# Patient Record
Sex: FEMALE | Race: WHITE | NOT HISPANIC OR LATINO | Employment: FULL TIME | URBAN - METROPOLITAN AREA
[De-identification: names, ages, dates, MRNs, and addresses within clinical notes are randomized per-mention and may not be internally consistent; named-entity substitution may affect disease eponyms.]

---

## 2020-01-20 ENCOUNTER — OFFICE VISIT (OUTPATIENT)
Dept: NEUROLOGY | Facility: CLINIC | Age: 30
End: 2020-01-20

## 2020-01-20 VITALS — HEIGHT: 64 IN | OXYGEN SATURATION: 98 % | HEART RATE: 84 BPM

## 2020-01-20 DIAGNOSIS — G40.309 NONINTRACTABLE GENERALIZED IDIOPATHIC EPILEPSY WITHOUT STATUS EPILEPTICUS (HCC): Primary | ICD-10-CM

## 2020-01-20 PROCEDURE — 99213 OFFICE O/P EST LOW 20 MIN: CPT | Performed by: PSYCHIATRY & NEUROLOGY

## 2020-01-20 RX ORDER — LEVETIRACETAM 500 MG/1
500 TABLET ORAL 2 TIMES DAILY
COMMUNITY
Start: 2020-01-04 | End: 2020-01-20

## 2020-01-20 RX ORDER — NORETHINDRONE ACETATE AND ETHINYL ESTRADIOL AND FERROUS FUMARATE 1.5-30(21)
KIT ORAL
COMMUNITY
Start: 2020-01-15

## 2020-01-20 NOTE — PROGRESS NOTES
Chief Complaint   Patient presents with   • Seizures       Patient ID: Jaye Mcdaniels is a 29 y.o. female.    HPI: I have had the pleasure of seeing your patient Jaye in the neurology clinic this afternoon.  As you may know she is a 29-year-old female with a history of seizures.  She has not had a breakthrough seizure in several years now.  She is taking Keppra 500 mg twice daily.  She denies any side effects of that medication.  Her last seizure was in 2010.  She has had 3 total.  She does have generalized tonic-clonic seizures.  She denies any new symptoms neurologically.  No particular aura type sensations or warning of a seizure to come.    The following portions of the patient's history were reviewed and updated as appropriate: allergies, current medications, past family history, past medical history, past social history, past surgical history and problem list.    Review of Systems   Constitutional: Negative for activity change, appetite change and fatigue.   HENT: Negative for facial swelling, hearing loss and trouble swallowing.    Eyes: Negative for photophobia, redness and visual disturbance.   Respiratory: Negative for chest tightness, shortness of breath and wheezing.    Cardiovascular: Negative for chest pain, palpitations and leg swelling.   Gastrointestinal: Negative for abdominal pain, nausea and vomiting.   Endocrine: Negative for cold intolerance, heat intolerance and polydipsia.   Musculoskeletal: Negative for back pain, gait problem and neck pain.   Skin: Negative for color change, rash and wound.   Allergic/Immunologic: Negative for environmental allergies, food allergies and immunocompromised state.   Neurological: Positive for seizures (last seizure 08/30/2010). Negative for dizziness, tremors, syncope, facial asymmetry, speech difficulty, weakness, light-headedness, numbness and headaches.   Hematological: Negative for adenopathy. Does not bruise/bleed easily.   Psychiatric/Behavioral:  Negative for agitation, behavioral problems, confusion, decreased concentration, dysphoric mood, hallucinations, self-injury, sleep disturbance and suicidal ideas. The patient is not nervous/anxious and is not hyperactive.       I reviewed and agree with the above review of systems completed by the medical assistant.    Vitals:    20 0821   Pulse: 84   SpO2: 98%       Neurologic Exam     Mental Status   Oriented to person, place, and time.   Concentration: normal.   Level of consciousness: alert  Knowledge: consistent with education (No deficits found.).     Cranial Nerves     CN II   Visual fields full to confrontation.     CN III, IV, VI   Pupils are equal, round, and reactive to light.  Extraocular motions are normal.   CN III: no CN III palsy  CN VI: no CN VI palsy    CN V   Facial sensation intact.     CN VII   Facial expression full, symmetric.     CN VIII   CN VIII normal.     CN IX, X   CN IX normal.   CN X normal.     CN XI   CN XI normal.     CN XII   CN XII normal.     Motor Exam     Strength   Right neck flexion: 5/5  Left neck flexion: 5/5  Right neck extension: 5/5  Left neck extension: 5/5  Right deltoid: 5/5  Left deltoid: 5/5  Right biceps: 5/5  Left biceps: 5/5  Right triceps: 5/5  Left triceps: 5/5  Right wrist flexion: 5/5  Left wrist flexion: 5/5  Right wrist extension: 5/5  Left wrist extension: 5/5  Right interossei: 5/5  Left interossei: 5/5  Right abdominals: 5/5  Left abdominals: 5/5  Right iliopsoas: 5/5  Left iliopsoas: 5/5  Right quadriceps: 5/5  Left quadriceps: 5/5  Right hamstrin/5  Left hamstrin/5  Right glutei: 5/5  Left glutei: 5/5  Right anterior tibial: 5/5  Left anterior tibial: 5/5  Right posterior tibial: 5/5  Left posterior tibial: 5/5  Right peroneal: 5/5  Left peroneal: 5/5  Right gastroc: 5/5  Left gastroc: 5/5    Sensory Exam   Light touch normal.   Vibration normal.     Gait, Coordination, and Reflexes     Gait  Gait: normal    Reflexes   Right  brachioradialis: 2+  Left brachioradialis: 2+  Right biceps: 2+  Left biceps: 2+  Right triceps: 2+  Left triceps: 2+  Right patellar: 2+  Left patellar: 2+  Right achilles: 2+  Left achilles: 2+  Right : 2+  Left : 2+Station is normal.       Physical Exam   Constitutional: She is oriented to person, place, and time. She appears well-developed and well-nourished.   HENT:   Head: Normocephalic and atraumatic.   Eyes: Pupils are equal, round, and reactive to light. EOM are normal.   Cardiovascular: Normal rate and regular rhythm.   Pulmonary/Chest: Breath sounds normal.   Musculoskeletal: Normal range of motion.   Neurological: She is oriented to person, place, and time. Gait normal.   Reflex Scores:       Tricep reflexes are 2+ on the right side and 2+ on the left side.       Bicep reflexes are 2+ on the right side and 2+ on the left side.       Brachioradialis reflexes are 2+ on the right side and 2+ on the left side.       Patellar reflexes are 2+ on the right side and 2+ on the left side.       Achilles reflexes are 2+ on the right side and 2+ on the left side.  Skin: Skin is warm.   Vitals reviewed.      Procedures    Assessment/Plan: We will continue with the Keppra 500 mg twice daily.  Return to clinic in 1 year or sooner if needed.  A total of 15 minutes was spent face-to-face with the patient today.  Of that greater than 50% was spent discussing signs and symptoms of seizures, patient education, plan of care and prognosis.       Jaye was seen today for seizures.    Diagnoses and all orders for this visit:    Nonintractable generalized idiopathic epilepsy without status epilepticus (CMS/HCC)           Joel Waldron II, MD

## 2020-06-11 RX ORDER — LEVETIRACETAM 500 MG/1
TABLET ORAL
Qty: 180 TABLET | Refills: 1 | Status: SHIPPED | OUTPATIENT
Start: 2020-06-11 | End: 2020-12-18

## 2020-12-18 DIAGNOSIS — G40.309 NONINTRACTABLE GENERALIZED IDIOPATHIC EPILEPSY WITHOUT STATUS EPILEPTICUS (HCC): Primary | ICD-10-CM

## 2020-12-18 RX ORDER — LEVETIRACETAM 500 MG/1
TABLET ORAL
Qty: 180 TABLET | Refills: 0 | Status: SHIPPED | OUTPATIENT
Start: 2020-12-18 | End: 2021-03-15

## 2020-12-18 NOTE — TELEPHONE ENCOUNTER
Pt last seen on 01/20/2020. Pt to f/u in 1 year. Does not have an apt scheduled. Please advise. Thank you.

## 2021-01-21 ENCOUNTER — TELEPHONE (OUTPATIENT)
Dept: NEUROLOGY | Facility: CLINIC | Age: 31
End: 2021-01-21

## 2021-01-21 NOTE — TELEPHONE ENCOUNTER
JEF ALAN  [phone]    THE PT CALLED IN TO SCHEDULED HER 1 YEAR FOLLOW UP WITH DR COTTO TODAY AND THE FIRST AVAILABLE APPT WAS IN MAY SO SHE HAS BEEN SCHEDULED FOR 05/03. THE PT WAS LAST SEEN 01/20/20. SHE IS WANTING TO BE SEEN SOONER BECAUSE OF HER MEDICATION. SHE STATES SHE WILL BE OUT OF MEDICATION ROUGHLY AROUND MARCH 18. IS THERE ANY WAY SHE CAN BE SEEN BEFORE THEN? PLEASE GIVE HER A CALL TO DISCUSS.

## 2021-01-22 NOTE — TELEPHONE ENCOUNTER
Please advise. Called pt had Leave message. We can put pt on cancellation list. However since she has an apt we can give her enough refills until her apt in may. And then call for a sooner apt if needed. Please advise I told patient to call in march when she needs refills or let [facility] know to send us over a request we will make sure she has enough medication to last until her apt. Thank you.

## 2021-03-13 DIAGNOSIS — G40.309 NONINTRACTABLE GENERALIZED IDIOPATHIC EPILEPSY WITHOUT STATUS EPILEPTICUS (HCC): ICD-10-CM

## 2021-03-15 RX ORDER — LEVETIRACETAM 500 MG/1
TABLET ORAL
Qty: 180 TABLET | Refills: 0 | Status: SHIPPED | OUTPATIENT
Start: 2021-03-15 | End: 2021-06-15

## 2021-03-24 ENCOUNTER — TELEPHONE (OUTPATIENT)
Dept: NEUROLOGY | Facility: CLINIC | Age: 31
End: 2021-03-24

## 2021-03-24 NOTE — TELEPHONE ENCOUNTER
Dr. Steve (oral surgeon) called to see if Patient could go under general anesthesia.  was given post-it to call Dr. Steve back. [phone]

## 2021-05-03 ENCOUNTER — OFFICE VISIT (OUTPATIENT)
Dept: NEUROLOGY | Facility: CLINIC | Age: 31
End: 2021-05-03

## 2021-05-03 VITALS
DIASTOLIC BLOOD PRESSURE: 70 MMHG | SYSTOLIC BLOOD PRESSURE: 112 MMHG | OXYGEN SATURATION: 99 % | HEIGHT: 64 IN | HEART RATE: 79 BPM | WEIGHT: 149 LBS | BODY MASS INDEX: 25.44 KG/M2

## 2021-05-03 DIAGNOSIS — G40.309 NONINTRACTABLE GENERALIZED IDIOPATHIC EPILEPSY WITHOUT STATUS EPILEPTICUS (HCC): Primary | ICD-10-CM

## 2021-05-03 PROCEDURE — 99214 OFFICE O/P EST MOD 30 MIN: CPT | Performed by: PSYCHIATRY & NEUROLOGY

## 2021-05-03 RX ORDER — ACETAMINOPHEN AND CODEINE PHOSPHATE 120; 12 MG/5ML; MG/5ML
1 SOLUTION ORAL DAILY
COMMUNITY
Start: 2021-03-22

## 2021-05-03 RX ORDER — PRENATAL VIT/IRON FUM/FOLIC AC 27MG-0.8MG
1 TABLET ORAL DAILY
COMMUNITY

## 2021-05-03 NOTE — PROGRESS NOTES
Chief Complaint   Patient presents with   • Seizures       Patient ID: Jaye Mcdaniels is a 30 y.o. female.    HPI: I had the pleasure of seeing your patient.  As you may know she is a 30-year-old female with a history of seizures.  She has done quite well overall.  Her last seizure was in 2010.  She is currently taking Keppra 500 mg twice daily.  She says that the Keppra may cause some issues with short-term memory however not significant enough to try and change the medication for her.  She also has noted some slight anxiety issues however again not quality of life changing.  She does have an interest in trying to wean off medication since it has been much more than 2 years since her last seizure.  However she recently had a child who is now about 4 months old.  Therefore she would likely hold off for now.  No new symptoms neurologically.    The following portions of the patient's history were reviewed and updated as appropriate: allergies, current medications, past family history, past medical history, past social history, past surgical history and problem list.    Review of Systems   Constitutional: Negative for activity change, appetite change and fatigue.   Musculoskeletal: Negative for back pain, gait problem and neck pain.   Allergic/Immunologic: Negative for environmental allergies, food allergies and immunocompromised state.   Neurological: Positive for seizures (no recent seizures. ). Negative for dizziness, tremors, syncope, facial asymmetry, speech difficulty, weakness, light-headedness, numbness and headaches.   Hematological: Negative for adenopathy. Does not bruise/bleed easily.   Psychiatric/Behavioral: Negative for agitation, behavioral problems, confusion, decreased concentration, dysphoric mood, hallucinations, self-injury, sleep disturbance and suicidal ideas. The patient is not nervous/anxious and is not hyperactive.       I have reviewed the review of systems above performed by my medical  assistant.      Vitals:    21 0836   BP: 112/70   Pulse: 79   SpO2: 99%       Neurologic Exam     Mental Status   Oriented to person, place, and time.   Concentration: normal.   Level of consciousness: alert  Knowledge: consistent with education (No deficits found.).     Cranial Nerves     CN II   Visual fields full to confrontation.     CN III, IV, VI   Pupils are equal, round, and reactive to light.  Extraocular motions are normal.   CN III: no CN III palsy  CN VI: no CN VI palsy    CN V   Facial sensation intact.     CN VII   Facial expression full, symmetric.     CN VIII   CN VIII normal.     CN IX, X   CN IX normal.   CN X normal.     CN XI   CN XI normal.     CN XII   CN XII normal.     Motor Exam     Strength   Right neck flexion: 5/5  Left neck flexion: 5/5  Right neck extension: 5/5  Left neck extension: 5/5  Right deltoid: 5/5  Left deltoid: 5/5  Right biceps: 5/5  Left biceps: 5/5  Right triceps: 5/5  Left triceps: 5/5  Right wrist flexion: 5/5  Left wrist flexion: 5/5  Right wrist extension: 5/5  Left wrist extension: 5/5  Right interossei: 5/5  Left interossei: 5/5  Right abdominals: 5/5  Left abdominals: 5/5  Right iliopsoas: 5/5  Left iliopsoas: 5/5  Right quadriceps: 5/5  Left quadriceps: 5/5  Right hamstrin/5  Left hamstrin/5  Right glutei: 5/5  Left glutei: 5/5  Right anterior tibial: 5/5  Left anterior tibial: 5/5  Right posterior tibial: 5/5  Left posterior tibial: 5/5  Right peroneal: 5/5  Left peroneal: 5/5  Right gastroc: 5/5  Left gastroc: 5/5    Sensory Exam   Light touch normal.   Vibration normal.     Gait, Coordination, and Reflexes     Gait  Gait: normal    Reflexes   Right brachioradialis: 2+  Left brachioradialis: 2+  Right biceps: 2+  Left biceps: 2+  Right triceps: 2+  Left triceps: 2+  Right patellar: 2+  Left patellar: 2+  Right achilles: 2+  Left achilles: 2+  Right : 2+  Left : 2+Station is normal.       Physical Exam  Vitals reviewed.   Constitutional:        Appearance: She is well-developed.   HENT:      Head: Normocephalic and atraumatic.   Eyes:      Extraocular Movements: EOM normal.      Pupils: Pupils are equal, round, and reactive to light.   Cardiovascular:      Rate and Rhythm: Normal rate and regular rhythm.   Pulmonary:      Breath sounds: Normal breath sounds.   Musculoskeletal:         General: Normal range of motion.   Skin:     General: Skin is warm.   Neurological:      Mental Status: She is oriented to person, place, and time.      Gait: Gait is intact.      Deep Tendon Reflexes:      Reflex Scores:       Tricep reflexes are 2+ on the right side and 2+ on the left side.       Bicep reflexes are 2+ on the right side and 2+ on the left side.       Brachioradialis reflexes are 2+ on the right side and 2+ on the left side.       Patellar reflexes are 2+ on the right side and 2+ on the left side.       Achilles reflexes are 2+ on the right side and 2+ on the left side.        Procedures    Assessment/Plan: For now we will continue with the current dose of Keppra 500 mg twice daily.  May consider weaning in the future however we will discuss that further at her follow-up appointment in 1 year.  A total of 30 minutes was spent face-to-face with the patient today.  Of that greater than 50% of this time was spent discussing signs and symptoms of seizures, patient education, plan of care and prognosis.       Diagnoses and all orders for this visit:    1. 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

## 2021-06-15 DIAGNOSIS — G40.309 NONINTRACTABLE GENERALIZED IDIOPATHIC EPILEPSY WITHOUT STATUS EPILEPTICUS (HCC): ICD-10-CM

## 2021-06-15 RX ORDER — LEVETIRACETAM 500 MG/1
TABLET ORAL
Qty: 180 TABLET | Refills: 0 | Status: SHIPPED | OUTPATIENT
Start: 2021-06-15 | End: 2021-08-30

## 2021-08-28 DIAGNOSIS — G40.309 NONINTRACTABLE GENERALIZED IDIOPATHIC EPILEPSY WITHOUT STATUS EPILEPTICUS (HCC): ICD-10-CM

## 2021-08-30 RX ORDER — LEVETIRACETAM 500 MG/1
TABLET ORAL
Qty: 180 TABLET | Refills: 1 | Status: SHIPPED | OUTPATIENT
Start: 2021-08-30 | End: 2022-03-14

## 2022-03-13 DIAGNOSIS — G40.309 NONINTRACTABLE GENERALIZED IDIOPATHIC EPILEPSY WITHOUT STATUS EPILEPTICUS: ICD-10-CM

## 2022-03-14 RX ORDER — LEVETIRACETAM 500 MG/1
TABLET ORAL
Qty: 180 TABLET | Refills: 1 | Status: SHIPPED | OUTPATIENT
Start: 2022-03-14 | End: 2022-09-06

## 2022-05-06 ENCOUNTER — OFFICE VISIT (OUTPATIENT)
Dept: NEUROLOGY | Facility: CLINIC | Age: 32
End: 2022-05-06

## 2022-05-06 VITALS
DIASTOLIC BLOOD PRESSURE: 84 MMHG | HEART RATE: 98 BPM | SYSTOLIC BLOOD PRESSURE: 122 MMHG | BODY MASS INDEX: 25.61 KG/M2 | HEIGHT: 64 IN | OXYGEN SATURATION: 100 % | WEIGHT: 150 LBS

## 2022-05-06 DIAGNOSIS — G40.309 NONINTRACTABLE GENERALIZED IDIOPATHIC EPILEPSY WITHOUT STATUS EPILEPTICUS: Primary | ICD-10-CM

## 2022-05-06 PROBLEM — R76.8 HEPATITIS C ANTIBODY TEST POSITIVE: Status: ACTIVE | Noted: 2020-05-29

## 2022-05-06 PROBLEM — Z37.9 NORMAL LABOR: Status: ACTIVE | Noted: 2021-01-05

## 2022-05-06 PROCEDURE — 99214 OFFICE O/P EST MOD 30 MIN: CPT | Performed by: PSYCHIATRY & NEUROLOGY

## 2022-05-06 NOTE — PROGRESS NOTES
Chief Complaint   Patient presents with   • Seizures       Patient ID: Jaye Mcdaniels is a 31 y.o. female.    HPI:  I had the pleasure of seeing your patient.  As you may know she is a 31-year-old female here for the management of seizures.  She has done quite well overall.  Her last seizure was in 2010.  She is currently taking Keppra 500 mg twice daily.  She says that the Keppra may cause some issues with irritability however not significant enough to try and change the medication for her.  She also has noted some slight anxiety issues however again not quality of life changing.  No new symptoms neurologically.    The following portions of the patient's history were reviewed and updated as appropriate: allergies, current medications, past family history, past medical history, past social history, past surgical history and problem list.    Review of Systems   Constitutional: Negative for fatigue.   HENT: Positive for dental problem. Negative for facial swelling, sinus pressure and sinus pain.    Respiratory: Negative for chest tightness and shortness of breath.    Cardiovascular: Negative for chest pain, palpitations and leg swelling.   Gastrointestinal: Negative for nausea.   Genitourinary: Negative for frequency and urgency.   Musculoskeletal: Negative for back pain, gait problem, joint swelling, neck pain and neck stiffness.   Neurological: Positive for seizures. Negative for dizziness, tremors, syncope, speech difficulty, weakness, light-headedness, numbness and headaches.   Hematological: Does not bruise/bleed easily.   Psychiatric/Behavioral: Negative for agitation, behavioral problems, confusion, decreased concentration, self-injury and suicidal ideas. The patient is nervous/anxious.       I have reviewed the review of systems above performed by my medical assistant.      Vitals:    05/06/22 1125   BP: 122/84   Pulse: 98   SpO2: 100%       Neurologic Exam     Mental Status   Oriented to person, place, and  time.   Concentration: normal.   Level of consciousness: alert  Knowledge: consistent with education (No deficits found.).     Cranial Nerves     CN II   Visual fields full to confrontation.     CN III, IV, VI   Pupils are equal, round, and reactive to light.  Extraocular motions are normal.   CN III: no CN III palsy  CN VI: no CN VI palsy    CN V   Facial sensation intact.     CN VII   Facial expression full, symmetric.     CN VIII   CN VIII normal.     CN IX, X   CN IX normal.   CN X normal.     CN XI   CN XI normal.     CN XII   CN XII normal.     Motor Exam     Strength   Right neck flexion: 5/5  Left neck flexion: 5/5  Right neck extension: 5/5  Left neck extension: 5/5  Right deltoid: 5/5  Left deltoid: 5/5  Right biceps: 5/5  Left biceps: 5/5  Right triceps: 5/5  Left triceps: 5/5  Right wrist flexion: 5/5  Left wrist flexion: 5/5  Right wrist extension: 5/5  Left wrist extension: 5/5  Right interossei: 5/5  Left interossei: 5/5  Right abdominals: 5/5  Left abdominals: 5/5  Right iliopsoas: 5/5  Left iliopsoas: 5/5  Right quadriceps: 5/5  Left quadriceps: 5/5  Right hamstrin/5  Left hamstrin/5  Right glutei: 5/5  Left glutei: 5/5  Right anterior tibial: 5/5  Left anterior tibial: 5/5  Right posterior tibial: 5/5  Left posterior tibial: 5/5  Right peroneal: 5/5  Left peroneal: 5/5  Right gastroc: 5/5  Left gastroc: 5/5    Sensory Exam   Light touch normal.   Vibration normal.     Gait, Coordination, and Reflexes     Gait  Gait: normal    Reflexes   Right brachioradialis: 2+  Left brachioradialis: 2+  Right biceps: 2+  Left biceps: 2+  Right triceps: 2+  Left triceps: 2+  Right patellar: 2+  Left patellar: 2+  Right achilles: 2+  Left achilles: 2+  Right : 2+  Left : 2+Station is normal.       Physical Exam  Vitals reviewed.   Constitutional:       Appearance: She is well-developed.   HENT:      Head: Normocephalic and atraumatic.   Eyes:      Extraocular Movements: EOM normal.      Pupils:  Pupils are equal, round, and reactive to light.   Cardiovascular:      Rate and Rhythm: Normal rate and regular rhythm.   Pulmonary:      Breath sounds: Normal breath sounds.   Musculoskeletal:         General: Normal range of motion.   Skin:     General: Skin is warm.   Neurological:      Mental Status: She is oriented to person, place, and time.      Gait: Gait is intact.      Deep Tendon Reflexes:      Reflex Scores:       Tricep reflexes are 2+ on the right side and 2+ on the left side.       Bicep reflexes are 2+ on the right side and 2+ on the left side.       Brachioradialis reflexes are 2+ on the right side and 2+ on the left side.       Patellar reflexes are 2+ on the right side and 2+ on the left side.       Achilles reflexes are 2+ on the right side and 2+ on the left side.        Procedures    Assessment/Plan: We will continue current dose of Keppra.  We discussed the weaning process issue however we would like to hold off on that for now.  Return to clinic in 1 year or sooner if needed.  A total of 30 minutes was spent face-to-face with patient today.  Of that greater than 50% of this time spent discussing signs and symptoms of seizures, patient education, plan of care and prognosis.       Diagnoses and all orders for this visit:    1. Nonintractable generalized idiopathic epilepsy without status epilepticus (HCC) (Primary)           Joel Waldron II, MD

## 2022-09-03 DIAGNOSIS — G40.309 NONINTRACTABLE GENERALIZED IDIOPATHIC EPILEPSY WITHOUT STATUS EPILEPTICUS: ICD-10-CM

## 2022-09-06 RX ORDER — LEVETIRACETAM 500 MG/1
TABLET ORAL
Qty: 180 TABLET | Refills: 2 | Status: SHIPPED | OUTPATIENT
Start: 2022-09-06

## 2023-05-05 ENCOUNTER — OFFICE VISIT (OUTPATIENT)
Dept: NEUROLOGY | Facility: CLINIC | Age: 33
End: 2023-05-05
Payer: COMMERCIAL

## 2023-05-05 VITALS
OXYGEN SATURATION: 98 % | SYSTOLIC BLOOD PRESSURE: 114 MMHG | BODY MASS INDEX: 24.75 KG/M2 | WEIGHT: 145 LBS | HEIGHT: 64 IN | DIASTOLIC BLOOD PRESSURE: 72 MMHG | HEART RATE: 80 BPM

## 2023-05-05 DIAGNOSIS — G40.309 NONINTRACTABLE GENERALIZED IDIOPATHIC EPILEPSY WITHOUT STATUS EPILEPTICUS: Primary | ICD-10-CM

## 2023-05-05 PROCEDURE — 99213 OFFICE O/P EST LOW 20 MIN: CPT | Performed by: PSYCHIATRY & NEUROLOGY

## 2023-05-05 RX ORDER — LEVETIRACETAM 500 MG/1
500 TABLET ORAL 2 TIMES DAILY
Qty: 180 TABLET | Refills: 3 | Status: SHIPPED | OUTPATIENT
Start: 2023-05-05

## 2023-05-05 NOTE — PROGRESS NOTES
Chief Complaint   Patient presents with   • Seizures       Patient ID: Jaye Mcdaniels is a 32 y.o. female.    HPI:  I had the pleasure of seeing your patient.  As you may know she is a 32-year-old female here for the management of seizures.  She has done quite well overall.  Her last seizure was in 2010.  She is currently taking Keppra 500 mg twice daily.  She says that the Keppra may cause some issues with irritability however not significant enough to try and change the medication for her.  She also has noted some slight anxiety issues however again not quality of life changing.  No new symptoms neurologically.    The following portions of the patient's history were reviewed and updated as appropriate: allergies, current medications, past family history, past medical history, past social history, past surgical history and problem list.    Review of Systems   Musculoskeletal: Negative for back pain, gait problem and joint swelling.   Neurological: Negative for dizziness, tremors, seizures, syncope, facial asymmetry, speech difficulty, weakness, light-headedness, numbness and headaches.   Psychiatric/Behavioral: Negative for agitation, behavioral problems, confusion, decreased concentration, dysphoric mood, hallucinations, self-injury, sleep disturbance and suicidal ideas. The patient is not nervous/anxious and is not hyperactive.       I have reviewed the review of systems above performed by my medical assistant.      Vitals:    05/05/23 0954   BP: 114/72   Pulse: 80   SpO2: 98%       Neurologic Exam     Mental Status   Oriented to person, place, and time.   Concentration: normal.   Level of consciousness: alert  Knowledge: consistent with education (No deficits found.).     Cranial Nerves     CN II   Visual fields full to confrontation.     CN III, IV, VI   Pupils are equal, round, and reactive to light.  Extraocular motions are normal.   CN III: no CN III palsy  CN VI: no CN VI palsy    CN V   Facial sensation  intact.     CN VII   Facial expression full, symmetric.     CN VIII   CN VIII normal.     CN IX, X   CN IX normal.   CN X normal.     CN XI   CN XI normal.     CN XII   CN XII normal.     Motor Exam     Strength   Right neck flexion: 5/5  Left neck flexion: 5/5  Right neck extension: 5/5  Left neck extension: 5/5  Right deltoid: 5/5  Left deltoid: 5/5  Right biceps: 5/5  Left biceps: 5/5  Right triceps: 5/5  Left triceps: 5/5  Right wrist flexion: 5/5  Left wrist flexion: 5/5  Right wrist extension: 5/5  Left wrist extension: 5/5  Right interossei: 5/5  Left interossei: 5/5  Right abdominals: 5/5  Left abdominals: 5/5  Right iliopsoas: 5/5  Left iliopsoas: 5/5  Right quadriceps: 5/5  Left quadriceps: 5/5  Right hamstrin/5  Left hamstrin/5  Right glutei: 5/5  Left glutei: 5/5  Right anterior tibial: 5/5  Left anterior tibial: 5/5  Right posterior tibial: 5/5  Left posterior tibial: 5/5  Right peroneal: 5/5  Left peroneal: 5/5  Right gastroc: 5/5  Left gastroc: 5/5    Sensory Exam   Light touch normal.   Vibration normal.     Gait, Coordination, and Reflexes     Gait  Gait: normal    Reflexes   Right brachioradialis: 2+  Left brachioradialis: 2+  Right biceps: 2+  Left biceps: 2+  Right triceps: 2+  Left triceps: 2+  Right patellar: 2+  Left patellar: 2+  Right achilles: 2+  Left achilles: 2+  Right : 2+  Left : 2+Station is normal.       Physical Exam  Vitals reviewed.   Constitutional:       Appearance: She is well-developed.   HENT:      Head: Normocephalic and atraumatic.   Eyes:      Extraocular Movements: EOM normal.      Pupils: Pupils are equal, round, and reactive to light.   Cardiovascular:      Rate and Rhythm: Normal rate and regular rhythm.   Pulmonary:      Breath sounds: Normal breath sounds.   Musculoskeletal:         General: Normal range of motion.   Skin:     General: Skin is warm.   Neurological:      Mental Status: She is oriented to person, place, and time.      Gait: Gait is  intact.      Deep Tendon Reflexes:      Reflex Scores:       Tricep reflexes are 2+ on the right side and 2+ on the left side.       Bicep reflexes are 2+ on the right side and 2+ on the left side.       Brachioradialis reflexes are 2+ on the right side and 2+ on the left side.       Patellar reflexes are 2+ on the right side and 2+ on the left side.       Achilles reflexes are 2+ on the right side and 2+ on the left side.        Procedures    Assessment/Plan:  We will continue current dose of Keppra.  We discussed the weaning process issue however we would like to hold off on that for now.  Return to clinic in 1 year or sooner if needed.  A total of 30 minutes was spent face-to-face with patient today.  Of that greater than 50% of this time spent discussing signs and symptoms of seizures, patient education, plan of care and prognosis.       Diagnoses and all orders for this visit:    1. Nonintractable generalized idiopathic epilepsy without status epilepticus (Primary)

## 2023-09-12 ENCOUNTER — TELEPHONE (OUTPATIENT)
Dept: NEUROLOGY | Facility: CLINIC | Age: 33
End: 2023-09-12

## 2023-09-12 NOTE — TELEPHONE ENCOUNTER
Caller: Jaye Mcdaniels    Relationship: Self    Best call back number: [phone]    ho are you requesting to speak with (clinical staff, provider,  specific staff member):     What was the call regarding: PT HAS APPT ON 5-3-23 AND IT'S MARKED TO BE RESCHEDULED DUE TO DR COTTO BEING OUT. 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 THE 1ST AVAILABLE WAS NOT UNTIL AUG WHEN SCHOOL WILL BE IN SESSION.    PLEASE CALL PT BACK TO RESCHEDULE.

## 2023-09-12 NOTE — TELEPHONE ENCOUNTER
Did not receive paperwork. Call pt asked her to fax back attention guerrero and will be looking for this.

## 2023-09-12 NOTE — TELEPHONE ENCOUNTER
Caller: Jaye Mcdaniels    Relationship: Self    Best call back number: [phone]    What form or medical record are you requesting:   PHYSICIANS REPORT FOR AUT[employer]OBILE INSURANCE UNDERWRITING    Who is requesting this form or medical record from you:   PT IS GETTING NEW AUTOMOBILE INSURANCE    How would you like to receive the form or medical records (pick-up, mail, fax): FAX  If fax, what is the fax number: PLEASE USE NUMBER LISTED ON FORM    Timeframe paperwork needed: ASAP    Additional notes: THIS PAGE FORM WAS FAXED TO DR COTTO ON 9-6-23. PLEASE CONFIRM IT HAS BEEN RECEIVED AND LET PT KNOW WHEN IT WILL BE COMPLETED.

## 2024-05-03 ENCOUNTER — OFFICE VISIT (OUTPATIENT)
Dept: NEUROLOGY | Facility: CLINIC | Age: 34
End: 2024-05-03
Payer: COMMERCIAL

## 2024-05-03 VITALS
BODY MASS INDEX: 25.78 KG/M2 | WEIGHT: 151 LBS | RESPIRATION RATE: 20 BRPM | OXYGEN SATURATION: 99 % | SYSTOLIC BLOOD PRESSURE: 118 MMHG | HEIGHT: 64 IN | DIASTOLIC BLOOD PRESSURE: 78 MMHG | HEART RATE: 73 BPM

## 2024-05-03 DIAGNOSIS — G40.309 NONINTRACTABLE GENERALIZED IDIOPATHIC EPILEPSY WITHOUT STATUS EPILEPTICUS: ICD-10-CM

## 2024-05-03 RX ORDER — LEVETIRACETAM 500 MG/1
500 TABLET ORAL 2 TIMES DAILY
Qty: 180 TABLET | Refills: 3 | Status: SHIPPED | OUTPATIENT
Start: 2024-05-03

## 2024-05-03 NOTE — PROGRESS NOTES
DOS: 5/3/2024  NAME: Jaye Mcdaniels   : 1990  PCP: Mesha Dean MD    Chief Complaint   Patient presents with    Nonintractable generalized idiopathic epilepsy without stat      SUBJECTIVE  Neurological Problem:  33 y.o.  female with a past medical history of seizure disorder. They are seen in follow up today for seizure disorder, however the problem is new to the examiner. Patient last seen by Dr. Joel Waldron in May 2023, with a summary of the history taken from the previous note with additions/modifications as indicated. She is unaccompanied.    Interval History:   Mrs. Mcdaniels initially presented to our clinic in 2020 to reestablish care for a history of seizures.  She was a previous patient of Dr. Waldron's from [facility] and had been following him since .  Per review of chart she had not experienced a breakthrough seizure for several years prior to presenting.  She was on Keppra 500 mg twice daily.  She reported her last seizure was in  and she had had 3 total.  She states that her seizures were the generalized tonic-clonic type.  She denied any particular aura or sensation of warning prior to his seizure coming on.    She presents today in follow-up and reports that she has not experienced any breakthrough seizures since her last office visit in May of last year.  She states again her last seizure was in .  She continues to take Keppra 500 mg twice daily.  She does mention noticing some increased irritability however says it does not affect her lifestyle or relationships.  She says generally she notices it when she feels overstimulated. She reports feeling more tired over the past year but again, nothing that interferes with her life. She denies any focal or unilateral symptoms such as visual or speech deficits, facial droop, weakness/numbness on one side of the body. She denies any other health changes or concerns at this time.     Review of Systems  "  Musculoskeletal:  Negative for gait problem.   Neurological:  Negative for dizziness, tremors, seizures, syncope, facial asymmetry, speech difficulty, weakness, light-headedness, numbness and headaches.   Psychiatric/Behavioral:  Negative for agitation, behavioral problems, confusion, decreased concentration, dysphoric mood, hallucinations, self-injury, sleep disturbance and suicidal ideas. The patient is not nervous/anxious and is not hyperactive.         The following portions of the patient's history were reviewed and updated as appropriate: allergies, current medications, past family history, past medical history, past social history, past surgical history and problem list.    Current Medications:   Current Outpatient Medications:     JUNEL FE 1.5/30 1.5-30 MG-MCG tablet, , Disp: , Rfl:     levETIRAcetam (KEPPRA) 500 MG tablet, Take 1 tablet by mouth 2 (Two) Times a Day., Disp: 180 tablet, Rfl: 3    norethindrone (MICRONOR) 0.35 MG tablet, Take 1 tablet by mouth Daily. (Patient not taking: Reported on 5/3/2024), Disp: , Rfl:     Prenatal Vit-Fe Fumarate-FA (prenatal vitamin 27-0.8) 27-0.8 MG tablet tablet, Take 1 tablet by mouth Daily. (Patient not taking: Reported on 5/3/2024), Disp: , Rfl:   **I did not stop or change the above medications.  Patient's medication list was updated to reflect medications they have reported as currently taking, including medication changes made by other providers.    Objective   Vital Signs:  /78   Pulse 73   Resp 20   Ht 162.6 cm (64.02\")   Wt 68.5 kg (151 lb)   SpO2 99%   BMI 25.91 kg/m²   Body mass index is 25.91 kg/m².    Physical Exam   Physical Exam:  GENERAL: NAD, alert  HEENT: Normocephalic, atraumatic   Resp: Even and unlabored  Extremities: No edema  Skin: Warm and dry  Psychiatric: Normal mood and affect    Neurological:   MS: AOx3, recent/remote memory intact, normal attention/concentration, language intact, no neglect.   CN: visual acuity grossly normal, " "PERRL, EOMI, no nystagmus, no facial droop, no dysarthria, hearing symmetric, tongue midline, bilateral shoulder shrug symmetric  Motor: Normal tone. No tremor or abnormal movements noted. No asterixis.   Trapezius elevation: 5/5  Shoulder abductors 5/5  Elbow flexors 5/5  Elbow extensors 5/5   Left  2+  Right  2+  Hip flexors 5/5  Knee extensors 5/5  Hamstring strength 5/5  Dorsiflexors 5/5  Plantar flexors 5/5  Sensory: Intact to light touch in all four ext.  Coordination: No dysmetria finger to nose   Rapid alternating movements: Normal finger to thumb tap  Gait and station: Normal gait and station    Result Review :  The following data was reviewed by: KELLI Vanegas on 05/03/2024:  Laboratory Results:         Lab Results   Component Value Date    WBC 18.49 (H) 01/05/2021    HGB 10.9 (L) 01/06/2021    HCT 32.1 (L) 01/06/2021    MCV 88.0 01/05/2021     01/05/2021     No results found for: \"GLUCOSE\", \"BUN\", \"CREATININE\", \"EGFRIFNONA\", \"EGFRIFAFRI\", \"BCR\", \"K\", \"CO2\", \"CALCIUM\", \"PROTENTOTREF\", \"ALBUMIN\", \"LABIL2\", \"BILIRUBIN\", \"AST\", \"ALT\"  No results found for: \"HGBA1C\"  No results found for: \"CHOL\"  No results found for: \"HDL\"  No results found for: \"LDL\"  No results found for: \"TRIG\"  No results found for: \"RPR\"  No results found for: \"TSH\"  No results found for: \"TQHGIGYK56\"    Data reviewed : EEGs         Assessment and Plan   Diagnoses and all orders for this visit:    1. Nonintractable generalized idiopathic epilepsy without status epilepticus  -     levETIRAcetam (KEPPRA) 500 MG tablet; Take 1 tablet by mouth 2 (Two) Times a Day.  Dispense: 180 tablet; Refill: 3      We will continue Jaye on her current dosing of Keppra 500 mg BID. Discussed when to go to the ER and seizure precautions should she experience another seizure episode.     We will see Jaye back in 12 months, sooner if symptoms warrant.     KELLI Vanegas  [facility] [place] [place] [facility]   05/03/24       I spent 20 minutes " caring for Jaye on this date of service. This time includes time spent by me in the following activities:preparing for the visit, reviewing tests, obtaining and/or reviewing a separately obtained history, performing a medically appropriate examination and/or evaluation , counseling and educating the patient/family/caregiver, ordering medications, tests, or procedures, referring and communicating with other health care professionals , documenting information in the medical record, independently interpreting results and communicating that information with the patient/family/caregiver, and care coordination  Follow Up   No follow-ups on file.  Patient was given instructions and counseling regarding her condition or for health maintenance advice. Please see specific information pulled into the AVS if appropriate.       Dictated using Dragon Dictation

## 2025-05-02 ENCOUNTER — OFFICE VISIT (OUTPATIENT)
Dept: NEUROLOGY | Facility: CLINIC | Age: 35
End: 2025-05-02
Payer: COMMERCIAL

## 2025-05-02 VITALS
WEIGHT: 138 LBS | DIASTOLIC BLOOD PRESSURE: 66 MMHG | HEIGHT: 64 IN | SYSTOLIC BLOOD PRESSURE: 104 MMHG | OXYGEN SATURATION: 99 % | BODY MASS INDEX: 23.56 KG/M2 | HEART RATE: 77 BPM

## 2025-05-02 DIAGNOSIS — G43.109 MIGRAINE WITH AURA AND WITHOUT STATUS MIGRAINOSUS, NOT INTRACTABLE: ICD-10-CM

## 2025-05-02 DIAGNOSIS — G40.309 NONINTRACTABLE GENERALIZED IDIOPATHIC EPILEPSY WITHOUT STATUS EPILEPTICUS: Primary | ICD-10-CM

## 2025-05-02 RX ORDER — SUMATRIPTAN 50 MG/1
50 TABLET, FILM COATED ORAL ONCE AS NEEDED
Qty: 12 TABLET | Refills: 11 | Status: SHIPPED | OUTPATIENT
Start: 2025-05-02

## 2025-05-02 RX ORDER — LEVETIRACETAM 500 MG/1
500 TABLET ORAL 2 TIMES DAILY
Qty: 180 TABLET | Refills: 3 | Status: SHIPPED | OUTPATIENT
Start: 2025-05-02

## 2025-05-02 NOTE — PROGRESS NOTES
"DOS: 2025  NAME: Jaye Mcdaniels   : 1990  PCP: Mesha Dean MD    Chief Complaint   Patient presents with    Nonintractable generalized idiopathic epilepsy without stat      SUBJECTIVE  Neurological Problem:  34 y.o. right-handed female with a past medical history of seizure disorder. They are seen in follow up today for seizures. A summary of the history was taken from the previous note with additions/modifications as indicated. She is unaccompanied.    Interval History:   **For detailed interval history see progress note dated 5/3/2024.     Mrs. Mcdaniels follows with our clinic for a history of generalized seizures. She was a previous patient of Dr. Zuluaga from [facility] and has been following with him since . She has been maintained on Keppra 500 mg twice daily.  She reported her last seizure was in  and she had had 3 total.  I last saw her in follow-up in May 2024.  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    She presents today in follow-up.  She denies any seizure activity since her last office visit in May 2024.  She continues on Keppra 500 mg twice daily and denies any side effects.  She denies any focal symptoms, no visual or speech deficit, no facial droop or difficulty swallowing, no unilateral extremity weakness or numbness.  She denies falls, dizziness.  She denies any hospitalizations or ER visits.    She does endorse headaches today.  She calls them \"migraines\".  She says that they intermittently occur, maybe 1 every 3 months.  She says she began experiencing them within the past few years.  She feels them in her bifrontal head.  She says that she is light sensitive with these, no sound sensitivity, no nausea or vomiting.  She says these headaches will typically last a day.  She will take over-the-counter Tylenol or ibuprofen which lessens the intensity but does not eliminate the headache.  She cannot pinpoint any triggers.  She " "denies a family history of migraine.  She does endorse 2 concussions in the past related to her previous seizures.  She denies missing work with these headaches.    Review of Systems   HENT:  Negative for trouble swallowing.    Eyes:  Positive for photophobia and visual disturbance (blurry with headaches).   Gastrointestinal:  Negative for nausea and vomiting.   Musculoskeletal:  Negative for gait problem.   Neurological:  Positive for headaches. Negative for dizziness, tremors, seizures, syncope, facial asymmetry, speech difficulty, weakness and numbness.   Psychiatric/Behavioral:  Negative for agitation and behavioral problems.         The following portions of the patient's history were reviewed and updated as appropriate: allergies, current medications, past family history, past medical history, past social history, past surgical history and problem list.    Current Medications:   Current Outpatient Medications:     JUNEL FE 1.5/30 1.5-30 MG-MCG tablet, , Disp: , Rfl:     levETIRAcetam (KEPPRA) 500 MG tablet, Take 1 tablet by mouth 2 (Two) Times a Day., Disp: 180 tablet, Rfl: 3    norethindrone (MICRONOR) 0.35 MG tablet, Take 1 tablet by mouth Daily. (Patient not taking: Reported on 5/3/2024), Disp: , Rfl:     Prenatal Vit-Fe Fumarate-FA (prenatal vitamin 27-0.8) 27-0.8 MG tablet tablet, Take 1 tablet by mouth Daily. (Patient not taking: Reported on 5/3/2024), Disp: , Rfl:     SUMAtriptan (Imitrex) 50 MG tablet, Take 1 tablet by mouth 1 (One) Time As Needed for Migraine. Take one tablet at onset of headache. May repeat dose one time in 2 hours if headache not relieved., Disp: 12 tablet, Rfl: 11  **I did not stop or change the above medications.  Patient's medication list was updated to reflect medications they have reported as currently taking, including medication changes made by other providers.    Objective   Vital Signs:  /66   Pulse 77   Ht 162.6 cm (64.02\")   Wt 62.6 kg (138 lb)   SpO2 99%   BMI " "23.68 kg/m²   Body mass index is 23.68 kg/m².    Physical Exam   Physical Exam:  GENERAL: NAD, alert, vital signs reviewed  HEENT: Normocephalic, atraumatic   Resp: Even and unlabored    Neurological:   MS: AOx3, recent/remote memory intact, normal attention/concentration, language intact, no neglect  CN: visual acuity grossly normal, PERRL, EOMI, no nystagmus, no facial droop, no dysarthria, hearing symmetric bilaterally to finger rub, tongue midline, bilateral shoulder shrug symmetric  Motor: Normal tone and bulk. No tremor or abnormal movements noted. No asterixis.  Deltoid: 5/5 right; 5/5 left  Biceps: 5/5 right; 5/5 left  Triceps: 5/5 right; 5/5 left  Left  2+  Right  2+  Iliopsoas: 5/5 right; 5/5 left  Quadriceps: 5/5 right; 5/5 left  Hamstrin/5 right; 5/5 left  Tibialis interior: 5/5 right; 5/5 left  Toe extensors: 5/5 LLE, 5/5 RLE  Toe flexors: 5/5 LLE, 5/5 RLE  Sensory: Intact to crude touch in all four ext.  Coordination: No dysmetria finger to nose   Gait and station: Normal gait and station    Result Review :  The following data was reviewed by: KELLI Vanegas on 2025:  Laboratory Results:         Lab Results   Component Value Date    WBC 18.49 (H) 2021    HGB 10.9 (L) 2021    HCT 32.1 (L) 2021    MCV 88.0 2021     2021     No results found for: \"GLUCOSE\", \"BUN\", \"CREATININE\", \"EGFRIFNONA\", \"EGFRIFAFRI\", \"BCR\", \"K\", \"CO2\", \"CALCIUM\", \"PROTENTOTREF\", \"ALBUMIN\", \"LABIL2\", \"BILIRUBIN\", \"AST\", \"ALT\"  No results found for: \"HGBA1C\"  No results found for: \"CHOL\"  No results found for: \"HDL\"  No results found for: \"LDL\"  No results found for: \"TRIG\"  No results found for: \"RPR\"  No results found for: \"TSH\"  No results found for: \"[id number]\"               Assessment and Plan   Diagnoses and all orders for this visit:    1. Nonintractable generalized idiopathic epilepsy without status epilepticus (Primary)  -     levETIRAcetam (KEPPRA) 500 MG tablet; " Take 1 tablet by mouth 2 (Two) Times a Day.  Dispense: 180 tablet; Refill: 3    2. Migraine with aura and without status migrainosus, not intractable  -     SUMAtriptan (Imitrex) 50 MG tablet; Take 1 tablet by mouth 1 (One) Time As Needed for Migraine. Take one tablet at onset of headache. May repeat dose one time in 2 hours if headache not relieved.  Dispense: 12 tablet; Refill: 11        Continue Keppra 500 mg twice a day for seizure prophylaxis.  Reminded of seizure precautions including no driving for 90 days in [facility] after episode of loss consciousness.  It does sound like she is experiencing infrequent migraine headaches.  We will try sumatriptan as needed as a rescue medication, she does not require a preventative method.  Side effects discussed.    We will see Jaye back in 12 months, sooner if symptoms warrant.     KELLI Vanegas  [facility]   05/02/25       I spent 30 minutes caring for Jaye on this date of service. This time includes time spent by me in the following activities:preparing for the visit, obtaining and/or reviewing a separately obtained history, performing a medically appropriate examination and/or evaluation , counseling and educating the patient/family/caregiver, ordering medications, tests, or procedures, documenting information in the medical record, independently interpreting results and communicating that information with the patient/family/caregiver, and care coordination  Follow Up   No follow-ups on file.  Patient was given instructions and counseling regarding her condition or for health maintenance advice. Please see specific information pulled into the AVS if appropriate.       Dictated using Dragon Dictation    As of April 2021, as required by the Federal 21st Century Cures Act, medical records (including provider notes and laboratory/imaging results) are to be made available to patient’s and/or their designees as soon as the documents are  signed/resulted. While the intention is to ensure transparency and to engage the patient in their healthcare, this immediate access may create unintended consequences as this document uses language intended for communication between medical experts and diagnostic results are interpreted with the entirety of the patient’s clinical picture in mind. It is recommended that patients and/or their designees review all available information with their primary or specialist providers for explanation and guidance to avoid misinterpretation based on layperson understanding, non-medical expert opinions, or Internet searches.